# Patient Record
Sex: FEMALE | Race: WHITE | NOT HISPANIC OR LATINO | Employment: OTHER | URBAN - METROPOLITAN AREA
[De-identification: names, ages, dates, MRNs, and addresses within clinical notes are randomized per-mention and may not be internally consistent; named-entity substitution may affect disease eponyms.]

---

## 2023-11-21 ENCOUNTER — OFFICE VISIT (OUTPATIENT)
Dept: GASTROENTEROLOGY | Facility: CLINIC | Age: 88
End: 2023-11-21
Payer: MEDICARE

## 2023-11-21 VITALS — WEIGHT: 122.4 LBS | BODY MASS INDEX: 22.52 KG/M2 | HEIGHT: 62 IN

## 2023-11-21 DIAGNOSIS — K59.00 CONSTIPATION, UNSPECIFIED CONSTIPATION TYPE: Primary | ICD-10-CM

## 2023-11-21 PROCEDURE — 99213 OFFICE O/P EST LOW 20 MIN: CPT | Performed by: INTERNAL MEDICINE

## 2023-11-21 PROCEDURE — 1036F TOBACCO NON-USER: CPT | Performed by: INTERNAL MEDICINE

## 2023-11-21 PROCEDURE — 1159F MED LIST DOCD IN RCRD: CPT | Performed by: INTERNAL MEDICINE

## 2023-11-21 RX ORDER — LISINOPRIL 40 MG/1
40 TABLET ORAL
COMMUNITY
Start: 2023-09-13

## 2023-11-21 RX ORDER — POTASSIUM CHLORIDE 750 MG/1
10 TABLET, EXTENDED RELEASE ORAL
COMMUNITY
Start: 2023-08-08

## 2023-11-21 RX ORDER — ASPIRIN 325 MG
1 TABLET, DELAYED RELEASE (ENTERIC COATED) ORAL DAILY
COMMUNITY

## 2023-11-21 RX ORDER — OMEPRAZOLE 20 MG/1
20 CAPSULE, DELAYED RELEASE ORAL
COMMUNITY
Start: 2022-10-13

## 2023-11-21 RX ORDER — SODIUM CHLORIDE 0.9 % (FLUSH) 0.9 %
10 SYRINGE (ML) INJECTION
COMMUNITY
Start: 2023-06-06 | End: 2024-09-04

## 2023-11-21 RX ORDER — GABAPENTIN 300 MG/1
300 CAPSULE ORAL 3 TIMES DAILY
COMMUNITY
Start: 2023-06-29

## 2023-11-21 RX ORDER — ERGOCALCIFEROL (VITAMIN D2) 200 MCG/ML
DROPS ORAL
COMMUNITY

## 2023-11-21 RX ORDER — LISINOPRIL AND HYDROCHLOROTHIAZIDE 20; 25 MG/1; MG/1
1 TABLET ORAL
COMMUNITY
Start: 2021-04-20

## 2023-11-21 RX ORDER — MULTIVIT-MIN/IRON/FOLIC ACID/K 18-600-40
1 CAPSULE ORAL DAILY
COMMUNITY

## 2023-11-21 RX ORDER — FELODIPINE 5 MG/1
5 TABLET, EXTENDED RELEASE ORAL
COMMUNITY
Start: 2023-09-13

## 2023-11-21 RX ORDER — ROPINIROLE 0.25 MG/1
0.25 TABLET, FILM COATED ORAL 2 TIMES DAILY
COMMUNITY
Start: 2023-06-22 | End: 2023-12-19

## 2023-11-21 NOTE — PROGRESS NOTES
"Subjective     History of Present Illness:   Loida Morgan is a 89 y.o. female with PMHx of constipation who presents to GI clinic for follow up.  \"I'm her to beg for samples of Linzess.   Takes it 2-3 times per month, with good results.   Takes fiber daily (in coffee), drinks liquids (d/t \"kidney problems\" - no blood in stool.     Review of Systems  Review of Systems    Allergies  No Known Allergies    Medications  Current Outpatient Medications   Medication Instructions    ergocalciferol (Vitamin D-2) 200 mcg/mL (8,000 unit/mL) drops oral    felodipine ER (PLENDIL) 5 mg, oral, Daily RT    gabapentin (NEURONTIN) 300 mg, oral, 3 times daily    linaCLOtide (Linzess) 72 mcg capsule oral    lisinopriL-hydrochlorothiazide 20-25 mg tablet 1 tablet, oral, Daily RT    lisinopril 40 mg, oral, Daily RT    multivitamin-min-iron-FA-vit K (Multi For Her) 18 mg iron-600 mcg-40 mcg capsule 1 capsule, oral, Daily    omega-3 (Fish OiL) 60- mg capsule 1 capsule, oral, Daily    omeprazole (PRILOSEC) 20 mg, oral, Daily RT    potassium chloride CR 10 mEq ER tablet 10 mEq, oral, Daily RT    rOPINIRole (REQUIP) 0.25 mg, oral, 2 times daily    sodium chloride 0.9% (sodium chloride) flush 10 mL, intravenous        Objective   There were no vitals taken for this visit.   Physical Exam    She declined exam.       No results found for: \"WBC\", \"HGB\", \"HCT\", \"PLT\"  No results found for: \"NA\", \"K\", \"CL\", \"CO2\", \"BUN\", \"CREATININE\", \"CALCIUM\", \"PROT\", \"BILITOT\", \"ALKPHOS\", \"ALT\", \"AST\", \"GLUCOSE\", \"SCRE\", \"CHOL\", \"LDLF\", \"HDLC\", \"LCTRG\", \"CHHDL\"  BI mammo bilateral screening tomosynthesis  MRN: [id number]  Patient Name: LOIDA MORGAN     STUDY:  DIGITAL MAMM SCREENING W/ VI;  10/4/2022 1:55 pm     ACCESSION NUMBER(S):  [id number]     ORDERING CLINICIAN:  TINY AMAYA     INDICATION:  Screening.     COMPARISON:  02/08/2018 with right breast ultrasound 02/20/2018, 02/07/2017,  01/20/2016     FINDINGS:  2D and tomosynthesis images were reviewed at 1 mm " "slice thickness.     There are areas of scattered fibroglandular tissue.  No interval  change. No suspicious masses or calcifications are identified.     This study was interpreted with CAD.     IMPRESSION:  No mammographic evidence of malignancy.     BI-RADS CATEGORY:     Category: 1 - Negative.  Recommendation:  1 Year Screening.  Patient letter sent SNORM           Cris Morgan is a 89 y.o. female for follow up of constipation.   \"Just here for samples of Linzess\" - no Linzess available, but samples of Trulance, 3 mg daily given - she declines exam, testing - will continue with fiber, fluids, call me prn       Calos To MD         "